# Patient Record
Sex: FEMALE | Race: BLACK OR AFRICAN AMERICAN | NOT HISPANIC OR LATINO | Employment: FULL TIME | URBAN - METROPOLITAN AREA
[De-identification: names, ages, dates, MRNs, and addresses within clinical notes are randomized per-mention and may not be internally consistent; named-entity substitution may affect disease eponyms.]

---

## 2023-04-04 ENCOUNTER — HOSPITAL ENCOUNTER (OUTPATIENT)
Dept: DATA CONVERSION | Facility: HOSPITAL | Age: 28
End: 2023-04-04
Attending: OBSTETRICS & GYNECOLOGY

## 2023-04-04 DIAGNOSIS — Z00.6 ENCOUNTER FOR EXAMINATION FOR NORMAL COMPARISON AND CONTROL IN CLINICAL RESEARCH PROGRAM: ICD-10-CM

## 2023-10-23 PROBLEM — M99.09 SEGMENTAL AND SOMATIC DYSFUNCTION: Status: ACTIVE | Noted: 2023-10-23

## 2023-10-23 PROBLEM — K64.9 HEMORRHOIDS: Status: ACTIVE | Noted: 2023-10-23

## 2023-10-23 PROBLEM — R87.612 LGSIL ON PAP SMEAR OF CERVIX: Status: ACTIVE | Noted: 2023-10-23

## 2023-10-23 PROBLEM — J30.2 SEASONAL ALLERGIES: Status: ACTIVE | Noted: 2023-10-23

## 2023-10-23 RX ORDER — IBUPROFEN 600 MG/1
1 TABLET ORAL EVERY 6 HOURS PRN
COMMUNITY
Start: 2023-03-21

## 2023-10-24 ENCOUNTER — APPOINTMENT (OUTPATIENT)
Dept: OBSTETRICS AND GYNECOLOGY | Facility: CLINIC | Age: 28
End: 2023-10-24
Payer: COMMERCIAL

## 2023-10-25 ENCOUNTER — PROCEDURE VISIT (OUTPATIENT)
Dept: OBSTETRICS AND GYNECOLOGY | Facility: CLINIC | Age: 28
End: 2023-10-25
Payer: COMMERCIAL

## 2023-10-25 VITALS
SYSTOLIC BLOOD PRESSURE: 114 MMHG | WEIGHT: 285 LBS | DIASTOLIC BLOOD PRESSURE: 69 MMHG | HEART RATE: 74 BPM | BODY MASS INDEX: 40.89 KG/M2

## 2023-10-25 DIAGNOSIS — Z30.432 ENCOUNTER FOR IUD REMOVAL: ICD-10-CM

## 2023-10-25 DIAGNOSIS — Z30.017 NEXPLANON INSERTION: ICD-10-CM

## 2023-10-25 DIAGNOSIS — Z30.09 ENCOUNTER FOR COUNSELING REGARDING CONTRACEPTION: Primary | ICD-10-CM

## 2023-10-25 PROCEDURE — 58301 REMOVE INTRAUTERINE DEVICE: CPT

## 2023-10-25 PROCEDURE — 58301 REMOVE INTRAUTERINE DEVICE: CPT | Mod: GC | Performed by: OBSTETRICS & GYNECOLOGY

## 2023-10-25 PROCEDURE — 11981 INSERTION DRUG DLVR IMPLANT: CPT | Performed by: OBSTETRICS & GYNECOLOGY

## 2023-10-25 PROCEDURE — 2500000004 HC RX 250 GENERAL PHARMACY W/ HCPCS (ALT 636 FOR OP/ED): Mod: SE

## 2023-10-25 RX ADMIN — ETONOGESTREL 1 EACH: 68 IMPLANT SUBCUTANEOUS at 17:00

## 2023-10-25 ASSESSMENT — PAIN SCALES - GENERAL: PAINLEVEL: 0-NO PAIN

## 2023-10-25 NOTE — PROGRESS NOTES
Patient ID: Dorys Mclean is a 28 y.o. female who presents for removal of IUD and insertion of nexplanon.    Patient states she is happy with her IUD but her partner is bothered by the strings during intercourse. Desires nexplanon placement. Discussed option of trimming strings, as well as risks/benefits of nexplanon placement and bleeding profile. Patient desires to proceed with removal/insertion of nexplanon.      Insertion of Contraceptive Capsule    Date/Time: 10/25/2023 5:07 PM    Performed by: Kelli Paz MD  Authorized by: Thuy Livingston MD    Consent:     Consent obtained:  Verbal    Consent given by:  Patient    Procedural risks discussed:  Bleeding and infection    Patient questions answered: yes      Patient agrees, verbalizes understanding, and wants to proceed: yes      Instructions and paperwork completed: yes    Indication:     Indication: Insertion of non-biodegradable drug delivery implant    Pre-procedure:     Pre-procedure timeout performed: yes      Prepped with: alcohol 70% and povidone-iodine      Local anesthetic:  Lidocaine 1%    The site was cleaned and prepped in a sterile fashion: yes    Procedure:     Procedure:  Insertion    Small stab incision was made in arm: yes      Left/right:  Left    Preloaded contraceptive capsule trocar was placed subdermally: yes      Visualization of implant was obtained: yes      Contraceptive capsule was inserted and trocar removed: yes      Visualization of notch in stylet and palpation of device: yes      Palpation confirms placement by provider and patient: yes      Site was closed with steri-strips and pressure bandage applied: yes    IUD Removal    Date/Time: 10/25/2023 5:19 PM    Performed by: Kelli Paz MD  Authorized by: Thuy Livingston MD    Consent:     Consent obtained:  Verbal    Consent given by:  Patient    Procedure risks and benefits discussed: yes      Patient questions answered: yes      Patient agrees, verbalizes  understanding, and wants to proceed: yes    Procedure:     Removed with no complications: yes    Comments:      Speculum was placed. Cerivx was visualized with IUD strings in appropriate location. Strings grasped with ring forceps and IUD removed with gentle traction. Specimen evaluated and found to be complete.      RTC as needed and for annual GYN exam    Procedure completed with Dr. Livingston at bedside.  Kelli Paz MD  PGY-2, Obstetrics and Gynecology

## 2024-01-29 ENCOUNTER — HOSPITAL ENCOUNTER (EMERGENCY)
Facility: HOSPITAL | Age: 29
Discharge: HOME | End: 2024-01-29
Attending: EMERGENCY MEDICINE
Payer: COMMERCIAL

## 2024-01-29 ENCOUNTER — APPOINTMENT (OUTPATIENT)
Dept: RADIOLOGY | Facility: HOSPITAL | Age: 29
End: 2024-01-29
Payer: COMMERCIAL

## 2024-01-29 VITALS
DIASTOLIC BLOOD PRESSURE: 64 MMHG | TEMPERATURE: 99.4 F | HEART RATE: 99 BPM | SYSTOLIC BLOOD PRESSURE: 104 MMHG | RESPIRATION RATE: 20 BRPM | HEIGHT: 70 IN | BODY MASS INDEX: 41.23 KG/M2 | WEIGHT: 288 LBS | OXYGEN SATURATION: 100 %

## 2024-01-29 DIAGNOSIS — J22 ACUTE RESPIRATORY INFECTION: ICD-10-CM

## 2024-01-29 LAB
ALBUMIN SERPL BCP-MCNC: 4.3 G/DL (ref 3.4–5)
ALP SERPL-CCNC: 57 U/L (ref 33–110)
ALT SERPL W P-5'-P-CCNC: 16 U/L (ref 7–45)
ANION GAP SERPL CALC-SCNC: 15 MMOL/L (ref 10–20)
APPEARANCE UR: CLEAR
AST SERPL W P-5'-P-CCNC: 15 U/L (ref 9–39)
BASOPHILS # BLD AUTO: 0.05 X10*3/UL (ref 0–0.1)
BASOPHILS NFR BLD AUTO: 0.4 %
BILIRUB SERPL-MCNC: 0.5 MG/DL (ref 0–1.2)
BILIRUB UR STRIP.AUTO-MCNC: NEGATIVE MG/DL
BUN SERPL-MCNC: 9 MG/DL (ref 6–23)
CALCIUM SERPL-MCNC: 9.6 MG/DL (ref 8.6–10.6)
CHLORIDE SERPL-SCNC: 104 MMOL/L (ref 98–107)
CO2 SERPL-SCNC: 25 MMOL/L (ref 21–32)
COLOR UR: YELLOW
CREAT SERPL-MCNC: 0.82 MG/DL (ref 0.5–1.05)
D DIMER PPP FEU-MCNC: 623 NG/ML FEU
EGFRCR SERPLBLD CKD-EPI 2021: >90 ML/MIN/1.73M*2
EOSINOPHIL # BLD AUTO: 0.34 X10*3/UL (ref 0–0.7)
EOSINOPHIL NFR BLD AUTO: 2.8 %
ERYTHROCYTE [DISTWIDTH] IN BLOOD BY AUTOMATED COUNT: 13.2 % (ref 11.5–14.5)
FLUAV RNA RESP QL NAA+PROBE: NOT DETECTED
FLUBV RNA RESP QL NAA+PROBE: NOT DETECTED
GLUCOSE SERPL-MCNC: 100 MG/DL (ref 74–99)
GLUCOSE UR STRIP.AUTO-MCNC: NEGATIVE MG/DL
HCT VFR BLD AUTO: 39.4 % (ref 36–46)
HGB BLD-MCNC: 13.3 G/DL (ref 12–16)
HOLD SPECIMEN: NORMAL
IMM GRANULOCYTES # BLD AUTO: 0.05 X10*3/UL (ref 0–0.7)
IMM GRANULOCYTES NFR BLD AUTO: 0.4 % (ref 0–0.9)
KETONES UR STRIP.AUTO-MCNC: NEGATIVE MG/DL
LEUKOCYTE ESTERASE UR QL STRIP.AUTO: NEGATIVE
LYMPHOCYTES # BLD AUTO: 1.81 X10*3/UL (ref 1.2–4.8)
LYMPHOCYTES NFR BLD AUTO: 15.1 %
MCH RBC QN AUTO: 27.1 PG (ref 26–34)
MCHC RBC AUTO-ENTMCNC: 33.8 G/DL (ref 32–36)
MCV RBC AUTO: 80 FL (ref 80–100)
MONOCYTES # BLD AUTO: 0.17 X10*3/UL (ref 0.1–1)
MONOCYTES NFR BLD AUTO: 1.4 %
NEUTROPHILS # BLD AUTO: 9.6 X10*3/UL (ref 1.2–7.7)
NEUTROPHILS NFR BLD AUTO: 79.9 %
NITRITE UR QL STRIP.AUTO: NEGATIVE
NRBC BLD-RTO: 0 /100 WBCS (ref 0–0)
PH UR STRIP.AUTO: 5 [PH]
PLATELET # BLD AUTO: 291 X10*3/UL (ref 150–450)
POTASSIUM SERPL-SCNC: 3.6 MMOL/L (ref 3.5–5.3)
PREGNANCY TEST URINE, POC: NEGATIVE
PROT SERPL-MCNC: 8.1 G/DL (ref 6.4–8.2)
PROT UR STRIP.AUTO-MCNC: NEGATIVE MG/DL
RBC # BLD AUTO: 4.91 X10*6/UL (ref 4–5.2)
RBC # UR STRIP.AUTO: NEGATIVE /UL
SARS-COV-2 RNA RESP QL NAA+PROBE: NOT DETECTED
SODIUM SERPL-SCNC: 140 MMOL/L (ref 136–145)
SP GR UR STRIP.AUTO: 1.02
UROBILINOGEN UR STRIP.AUTO-MCNC: <2 MG/DL
WBC # BLD AUTO: 12 X10*3/UL (ref 4.4–11.3)

## 2024-01-29 PROCEDURE — 81025 URINE PREGNANCY TEST: CPT

## 2024-01-29 PROCEDURE — 99285 EMERGENCY DEPT VISIT HI MDM: CPT | Performed by: EMERGENCY MEDICINE

## 2024-01-29 PROCEDURE — 71275 CT ANGIOGRAPHY CHEST: CPT

## 2024-01-29 PROCEDURE — 99284 EMERGENCY DEPT VISIT MOD MDM: CPT | Mod: 25

## 2024-01-29 PROCEDURE — 85379 FIBRIN DEGRADATION QUANT: CPT

## 2024-01-29 PROCEDURE — 2500000004 HC RX 250 GENERAL PHARMACY W/ HCPCS (ALT 636 FOR OP/ED): Mod: SE

## 2024-01-29 PROCEDURE — 84075 ASSAY ALKALINE PHOSPHATASE: CPT

## 2024-01-29 PROCEDURE — 2500000001 HC RX 250 WO HCPCS SELF ADMINISTERED DRUGS (ALT 637 FOR MEDICARE OP): Mod: SE

## 2024-01-29 PROCEDURE — 85025 COMPLETE CBC W/AUTO DIFF WBC: CPT

## 2024-01-29 PROCEDURE — 71046 X-RAY EXAM CHEST 2 VIEWS: CPT

## 2024-01-29 PROCEDURE — 36415 COLL VENOUS BLD VENIPUNCTURE: CPT

## 2024-01-29 PROCEDURE — 87636 SARSCOV2 & INF A&B AMP PRB: CPT

## 2024-01-29 PROCEDURE — 99284 EMERGENCY DEPT VISIT MOD MDM: CPT | Performed by: EMERGENCY MEDICINE

## 2024-01-29 PROCEDURE — 71275 CT ANGIOGRAPHY CHEST: CPT | Mod: FOREIGN READ | Performed by: RADIOLOGY

## 2024-01-29 PROCEDURE — 71046 X-RAY EXAM CHEST 2 VIEWS: CPT | Mod: FOREIGN READ | Performed by: RADIOLOGY

## 2024-01-29 PROCEDURE — 2550000001 HC RX 255 CONTRASTS: Mod: SE | Performed by: EMERGENCY MEDICINE

## 2024-01-29 PROCEDURE — 96360 HYDRATION IV INFUSION INIT: CPT | Mod: 59

## 2024-01-29 PROCEDURE — 81003 URINALYSIS AUTO W/O SCOPE: CPT

## 2024-01-29 RX ORDER — ACETAMINOPHEN 325 MG/1
650 TABLET ORAL ONCE
Status: COMPLETED | OUTPATIENT
Start: 2024-01-29 | End: 2024-01-29

## 2024-01-29 RX ORDER — IBUPROFEN 600 MG/1
600 TABLET ORAL ONCE
Status: COMPLETED | OUTPATIENT
Start: 2024-01-29 | End: 2024-01-29

## 2024-01-29 RX ORDER — ACETAMINOPHEN 325 MG/1
650 TABLET ORAL EVERY 6 HOURS PRN
Qty: 30 TABLET | Refills: 0 | Status: SHIPPED | OUTPATIENT
Start: 2024-01-29 | End: 2024-02-15 | Stop reason: WASHOUT

## 2024-01-29 RX ORDER — IBUPROFEN 600 MG/1
600 TABLET ORAL EVERY 8 HOURS PRN
Qty: 30 TABLET | Refills: 0 | Status: SHIPPED | OUTPATIENT
Start: 2024-01-29

## 2024-01-29 RX ADMIN — IBUPROFEN 600 MG: 600 TABLET, FILM COATED ORAL at 02:01

## 2024-01-29 RX ADMIN — SODIUM CHLORIDE, POTASSIUM CHLORIDE, SODIUM LACTATE AND CALCIUM CHLORIDE 1000 ML: 600; 310; 30; 20 INJECTION, SOLUTION INTRAVENOUS at 02:03

## 2024-01-29 RX ADMIN — IOHEXOL 75 ML: 350 INJECTION, SOLUTION INTRAVENOUS at 03:05

## 2024-01-29 RX ADMIN — ACETAMINOPHEN 650 MG: 325 TABLET ORAL at 02:01

## 2024-01-29 ASSESSMENT — COLUMBIA-SUICIDE SEVERITY RATING SCALE - C-SSRS
6. HAVE YOU EVER DONE ANYTHING, STARTED TO DO ANYTHING, OR PREPARED TO DO ANYTHING TO END YOUR LIFE?: NO
1. IN THE PAST MONTH, HAVE YOU WISHED YOU WERE DEAD OR WISHED YOU COULD GO TO SLEEP AND NOT WAKE UP?: NO
2. HAVE YOU ACTUALLY HAD ANY THOUGHTS OF KILLING YOURSELF?: NO

## 2024-01-29 ASSESSMENT — PAIN - FUNCTIONAL ASSESSMENT: PAIN_FUNCTIONAL_ASSESSMENT: 0-10

## 2024-01-29 NOTE — ED TRIAGE NOTES
PT arrives from home via EMS with c/o sudden onset fever, chills, shakes and x1 episode of bloody stool.

## 2024-01-29 NOTE — ED PROVIDER NOTES
HPI   Chief Complaint   Patient presents with    Flu Symptoms       Patient is a 28-year-old female presenting to the emergency department with flulike symptoms.  Patient states that she was feeling fine earlier today and roughly an hour prior to arrival, patient developed full body chills, shortness of breath accompanied with difficulty breathing.  Has not been around any sick contacts.  Denies affiliated chest pain.  Denies nausea vomiting or diarrhea, no abdominal pain, no urinary symptoms including blood in the urine.  Patient is endorsing blood after a bowel movement at home but does have a history of hemorrhoids.  Is not on blood thinners.  Denies myalgias or arthralgias.  No cough, congestion or rhinorrhea.  No other acute complaints                          Annita Coma Scale Score: 15                  Patient History   Past Medical History:   Diagnosis Date    Body mass index (BMI) 32.0-32.9, adult 07/29/2016    BMI 32.0-32.9,adult    COVID-19 06/11/2020    COVID-19    Encounter for pregnancy test, result negative 10/24/2022    Urine pregnancy test negative    Obesity complicating pregnancy, unspecified trimester 02/20/2020    Obesity in pregnancy    Obesity, unspecified 08/28/2018    Class 1 obesity    Other conditions influencing health status 08/05/2016    LGSIL on Pap smear    Personal history of other diseases of the digestive system 01/24/2017    History of constipation    Personal history of other diseases of the female genital tract 07/29/2016    History of amenorrhea     Past Surgical History:   Procedure Laterality Date    OTHER SURGICAL HISTORY  10/06/2020    No history of surgery     Family History   Problem Relation Name Age of Onset    Breast cancer Maternal Grandmother      Colon cancer Paternal Grandfather       Social History     Tobacco Use    Smoking status: Never    Smokeless tobacco: Never   Substance Use Topics    Alcohol use: Yes     Alcohol/week: 1.0 standard drink of alcohol      Types: 1 Standard drinks or equivalent per week    Drug use: Never       Physical Exam   ED Triage Vitals [01/29/24 0141]   Temperature Heart Rate Respirations BP   36.8 °C (98.2 °F) (!) 121 (!) 22 102/72      Pulse Ox Temp Source Heart Rate Source Patient Position   100 % Oral -- Sitting      BP Location FiO2 (%)     Left arm --       Physical Exam  Vitals and nursing note reviewed.   Constitutional:       General: She is not in acute distress.     Appearance: Normal appearance. She is not toxic-appearing.   HENT:      Head: Normocephalic.      Mouth/Throat:      Mouth: Mucous membranes are moist.   Eyes:      Conjunctiva/sclera: Conjunctivae normal.      Pupils: Pupils are equal, round, and reactive to light.   Cardiovascular:      Rate and Rhythm: Regular rhythm. Tachycardia present.      Pulses:           Radial pulses are 2+ on the right side and 2+ on the left side.   Pulmonary:      Effort: Pulmonary effort is normal. No respiratory distress.      Comments: Tachypnea with increased effort  Abdominal:      General: Abdomen is flat.      Palpations: Abdomen is soft.      Tenderness: There is no abdominal tenderness. There is no guarding or rebound.   Musculoskeletal:      Cervical back: Normal range of motion and neck supple.      Right lower leg: No edema.      Left lower leg: No edema.   Skin:     General: Skin is warm.   Neurological:      Mental Status: She is alert and oriented to person, place, and time.   Psychiatric:         Mood and Affect: Mood normal.         ED Course & MDM   Diagnoses as of 01/29/24 0358   Acute respiratory infection       Medical Decision Making  28-year-old female presenting to the emergency department with shortness of breath.  Patient is noted to be tachycardic in the 120s and tachypneic, borderline normotensive on exam, speaking in short sentences.  Given the acute onset of her symptoms, with affiliated shivers or rigors, my concern is for flu versus COVID versus viral  infection however cannot exclude acute pulmonary process such as pneumonia, or PE.  Labs ordered, dimer elevated with mild leukocytosis.  CT PE negative for pneumonia or pulmonary embolism.  Patient given IV fluids, Tylenol and ibuprofen for symptomatic management with improvement.  Viral swabs are negative, urine negative for infection.  Patient updated with results and is safe for discharge home if she likely has a viral respiratory infection.  Do not feel that further workup indicated at this time. Discussed results, diagnosis/differential, and plan with patient. Patient advised to follow up with primary physician in 2-3 days. Discussed return precautions and encouraged patient to return to the Emergency Department for any concerning symptoms or worsening condition. Patient expresses understanding and is in agreement. All questions answered. Patient discharged in stable condition.        Procedure  Procedures     Amada Kc DO  Resident  01/29/24 0350

## 2024-02-01 ENCOUNTER — HOSPITAL ENCOUNTER (EMERGENCY)
Facility: HOSPITAL | Age: 29
Discharge: HOME | End: 2024-02-01
Attending: EMERGENCY MEDICINE
Payer: COMMERCIAL

## 2024-02-01 ENCOUNTER — CLINICAL SUPPORT (OUTPATIENT)
Dept: EMERGENCY MEDICINE | Facility: HOSPITAL | Age: 29
End: 2024-02-01
Payer: COMMERCIAL

## 2024-02-01 VITALS
OXYGEN SATURATION: 97 % | HEART RATE: 75 BPM | RESPIRATION RATE: 18 BRPM | WEIGHT: 288 LBS | HEIGHT: 70 IN | TEMPERATURE: 99.2 F | DIASTOLIC BLOOD PRESSURE: 75 MMHG | SYSTOLIC BLOOD PRESSURE: 114 MMHG | BODY MASS INDEX: 41.23 KG/M2

## 2024-02-01 DIAGNOSIS — K64.9 HEMORRHOIDS, UNSPECIFIED HEMORRHOID TYPE: Primary | ICD-10-CM

## 2024-02-01 DIAGNOSIS — K62.5 RECTAL BLEEDING: ICD-10-CM

## 2024-02-01 DIAGNOSIS — B34.9 VIRAL ILLNESS: ICD-10-CM

## 2024-02-01 LAB
ABO GROUP (TYPE) IN BLOOD: NORMAL
ALBUMIN SERPL BCP-MCNC: 4.4 G/DL (ref 3.4–5)
ALP SERPL-CCNC: 56 U/L (ref 33–110)
ALT SERPL W P-5'-P-CCNC: 15 U/L (ref 7–45)
ANION GAP BLDV CALCULATED.4IONS-SCNC: 10 MMOL/L (ref 10–25)
ANION GAP SERPL CALC-SCNC: 15 MMOL/L (ref 10–20)
ANTIBODY SCREEN: NORMAL
APPEARANCE UR: CLEAR
AST SERPL W P-5'-P-CCNC: 12 U/L (ref 9–39)
BASE EXCESS BLDV CALC-SCNC: 5 MMOL/L (ref -2–3)
BASOPHILS # BLD AUTO: 0.06 X10*3/UL (ref 0–0.1)
BASOPHILS NFR BLD AUTO: 0.5 %
BILIRUB SERPL-MCNC: 0.4 MG/DL (ref 0–1.2)
BILIRUB UR STRIP.AUTO-MCNC: NEGATIVE MG/DL
BODY TEMPERATURE: 37 DEGREES CELSIUS
BUN SERPL-MCNC: 9 MG/DL (ref 6–23)
CA-I BLDV-SCNC: 1.24 MMOL/L (ref 1.1–1.33)
CALCIUM SERPL-MCNC: 10 MG/DL (ref 8.6–10.6)
CHLORIDE BLDV-SCNC: 105 MMOL/L (ref 98–107)
CHLORIDE SERPL-SCNC: 105 MMOL/L (ref 98–107)
CO2 SERPL-SCNC: 25 MMOL/L (ref 21–32)
COLOR UR: YELLOW
CREAT SERPL-MCNC: 0.77 MG/DL (ref 0.5–1.05)
EGFRCR SERPLBLD CKD-EPI 2021: >90 ML/MIN/1.73M*2
EOSINOPHIL # BLD AUTO: 0.25 X10*3/UL (ref 0–0.7)
EOSINOPHIL NFR BLD AUTO: 2.3 %
ERYTHROCYTE [DISTWIDTH] IN BLOOD BY AUTOMATED COUNT: 12.8 % (ref 11.5–14.5)
GLUCOSE BLDV-MCNC: 99 MG/DL (ref 74–99)
GLUCOSE SERPL-MCNC: 91 MG/DL (ref 74–99)
GLUCOSE UR STRIP.AUTO-MCNC: NEGATIVE MG/DL
HCO3 BLDV-SCNC: 29.9 MMOL/L (ref 22–26)
HCT VFR BLD AUTO: 38.1 % (ref 36–46)
HCT VFR BLD EST: 40 % (ref 36–46)
HGB BLD-MCNC: 13 G/DL (ref 12–16)
HGB BLDV-MCNC: 13.2 G/DL (ref 12–16)
IMM GRANULOCYTES # BLD AUTO: 0.03 X10*3/UL (ref 0–0.7)
IMM GRANULOCYTES NFR BLD AUTO: 0.3 % (ref 0–0.9)
INHALED O2 CONCENTRATION: 21 %
INR PPP: 1.1 (ref 0.9–1.1)
KETONES UR STRIP.AUTO-MCNC: NEGATIVE MG/DL
LACTATE BLDV-SCNC: 1.7 MMOL/L (ref 0.4–2)
LEUKOCYTE ESTERASE UR QL STRIP.AUTO: ABNORMAL
LYMPHOCYTES # BLD AUTO: 1.71 X10*3/UL (ref 1.2–4.8)
LYMPHOCYTES NFR BLD AUTO: 15.4 %
MCH RBC QN AUTO: 26.9 PG (ref 26–34)
MCHC RBC AUTO-ENTMCNC: 34.1 G/DL (ref 32–36)
MCV RBC AUTO: 79 FL (ref 80–100)
MONOCYTES # BLD AUTO: 0.12 X10*3/UL (ref 0.1–1)
MONOCYTES NFR BLD AUTO: 1.1 %
MUCOUS THREADS #/AREA URNS AUTO: NORMAL /LPF
NEUTROPHILS # BLD AUTO: 8.91 X10*3/UL (ref 1.2–7.7)
NEUTROPHILS NFR BLD AUTO: 80.4 %
NITRITE UR QL STRIP.AUTO: NEGATIVE
NRBC BLD-RTO: 0 /100 WBCS (ref 0–0)
OXYHGB MFR BLDV: 9.6 % (ref 45–75)
PCO2 BLDV: 44 MM HG (ref 41–51)
PH BLDV: 7.44 PH (ref 7.33–7.43)
PH UR STRIP.AUTO: 5 [PH]
PLATELET # BLD AUTO: 305 X10*3/UL (ref 150–450)
PO2 BLDV: 26 MM HG (ref 35–45)
POTASSIUM BLDV-SCNC: 5.1 MMOL/L (ref 3.5–5.3)
POTASSIUM SERPL-SCNC: 4.4 MMOL/L (ref 3.5–5.3)
PREGNANCY TEST URINE, POC: NEGATIVE
PROT SERPL-MCNC: 8 G/DL (ref 6.4–8.2)
PROT UR STRIP.AUTO-MCNC: NEGATIVE MG/DL
PROTHROMBIN TIME: 12.5 SECONDS (ref 9.8–12.8)
RBC # BLD AUTO: 4.84 X10*6/UL (ref 4–5.2)
RBC # UR STRIP.AUTO: NEGATIVE /UL
RBC #/AREA URNS AUTO: NORMAL /HPF
RH FACTOR (ANTIGEN D): NORMAL
SAO2 % BLDV: 10 % (ref 45–75)
SODIUM BLDV-SCNC: 140 MMOL/L (ref 136–145)
SODIUM SERPL-SCNC: 141 MMOL/L (ref 136–145)
SP GR UR STRIP.AUTO: 1.02
SQUAMOUS #/AREA URNS AUTO: NORMAL /HPF
UROBILINOGEN UR STRIP.AUTO-MCNC: 2 MG/DL
WBC # BLD AUTO: 11.1 X10*3/UL (ref 4.4–11.3)
WBC #/AREA URNS AUTO: NORMAL /HPF

## 2024-02-01 PROCEDURE — 86901 BLOOD TYPING SEROLOGIC RH(D): CPT | Performed by: EMERGENCY MEDICINE

## 2024-02-01 PROCEDURE — 81025 URINE PREGNANCY TEST: CPT | Performed by: EMERGENCY MEDICINE

## 2024-02-01 PROCEDURE — 36415 COLL VENOUS BLD VENIPUNCTURE: CPT | Performed by: EMERGENCY MEDICINE

## 2024-02-01 PROCEDURE — 80053 COMPREHEN METABOLIC PANEL: CPT | Performed by: EMERGENCY MEDICINE

## 2024-02-01 PROCEDURE — 84132 ASSAY OF SERUM POTASSIUM: CPT | Performed by: EMERGENCY MEDICINE

## 2024-02-01 PROCEDURE — 96360 HYDRATION IV INFUSION INIT: CPT

## 2024-02-01 PROCEDURE — 85025 COMPLETE CBC W/AUTO DIFF WBC: CPT | Performed by: EMERGENCY MEDICINE

## 2024-02-01 PROCEDURE — 2500000004 HC RX 250 GENERAL PHARMACY W/ HCPCS (ALT 636 FOR OP/ED): Mod: SE | Performed by: EMERGENCY MEDICINE

## 2024-02-01 PROCEDURE — 99283 EMERGENCY DEPT VISIT LOW MDM: CPT | Mod: 25

## 2024-02-01 PROCEDURE — 81001 URINALYSIS AUTO W/SCOPE: CPT | Performed by: EMERGENCY MEDICINE

## 2024-02-01 PROCEDURE — 99284 EMERGENCY DEPT VISIT MOD MDM: CPT | Performed by: NURSE PRACTITIONER

## 2024-02-01 PROCEDURE — 99284 EMERGENCY DEPT VISIT MOD MDM: CPT | Performed by: EMERGENCY MEDICINE

## 2024-02-01 PROCEDURE — 85610 PROTHROMBIN TIME: CPT | Performed by: EMERGENCY MEDICINE

## 2024-02-01 PROCEDURE — 93005 ELECTROCARDIOGRAM TRACING: CPT

## 2024-02-01 RX ORDER — DOCUSATE SODIUM 100 MG/1
100 CAPSULE, LIQUID FILLED ORAL EVERY 12 HOURS
Qty: 60 CAPSULE | Refills: 0 | Status: SHIPPED | OUTPATIENT
Start: 2024-02-01 | End: 2024-02-15 | Stop reason: WASHOUT

## 2024-02-01 RX ORDER — ACETAMINOPHEN 325 MG/1
975 TABLET ORAL ONCE
Status: COMPLETED | OUTPATIENT
Start: 2024-02-01 | End: 2024-02-01

## 2024-02-01 RX ADMIN — ACETAMINOPHEN 975 MG: 325 TABLET ORAL at 21:52

## 2024-02-01 RX ADMIN — SODIUM CHLORIDE 1000 ML: 0.9 INJECTION, SOLUTION INTRAVENOUS at 20:20

## 2024-02-01 ASSESSMENT — ENCOUNTER SYMPTOMS
CHILLS: 1
BLOOD IN STOOL: 1

## 2024-02-01 ASSESSMENT — LIFESTYLE VARIABLES
HAVE PEOPLE ANNOYED YOU BY CRITICIZING YOUR DRINKING: NO
EVER FELT BAD OR GUILTY ABOUT YOUR DRINKING: NO
EVER HAD A DRINK FIRST THING IN THE MORNING TO STEADY YOUR NERVES TO GET RID OF A HANGOVER: NO
HAVE YOU EVER FELT YOU SHOULD CUT DOWN ON YOUR DRINKING: NO

## 2024-02-01 ASSESSMENT — COLUMBIA-SUICIDE SEVERITY RATING SCALE - C-SSRS
2. HAVE YOU ACTUALLY HAD ANY THOUGHTS OF KILLING YOURSELF?: NO
1. IN THE PAST MONTH, HAVE YOU WISHED YOU WERE DEAD OR WISHED YOU COULD GO TO SLEEP AND NOT WAKE UP?: NO
6. HAVE YOU EVER DONE ANYTHING, STARTED TO DO ANYTHING, OR PREPARED TO DO ANYTHING TO END YOUR LIFE?: NO

## 2024-02-01 ASSESSMENT — PAIN SCALES - GENERAL
PAINLEVEL_OUTOF10: 0 - NO PAIN
PAINLEVEL_OUTOF10: 7

## 2024-02-01 ASSESSMENT — PAIN - FUNCTIONAL ASSESSMENT: PAIN_FUNCTIONAL_ASSESSMENT: 0-10

## 2024-02-02 LAB
ATRIAL RATE: 112 BPM
P AXIS: 44 DEGREES
P OFFSET: 201 MS
P ONSET: 156 MS
PR INTERVAL: 138 MS
Q ONSET: 225 MS
QRS COUNT: 18 BEATS
QRS DURATION: 56 MS
QT INTERVAL: 298 MS
QTC CALCULATION(BAZETT): 406 MS
QTC FREDERICIA: 367 MS
R AXIS: 64 DEGREES
T AXIS: 30 DEGREES
T OFFSET: 374 MS
VENTRICULAR RATE: 112 BPM

## 2024-02-02 NOTE — ED PROVIDER NOTES
Emergency Department Encounter  [facility] EMERGENCY MEDICINE    Patient: Dorys Mclean  MRN: [id number]  : 1995  Date of Evaluation: 2024  ED Provider: CHIVO Sullivan      Chief Complaint       Chief Complaint   Patient presents with    Rectal Bleeding        Limitations to History: none  Historian: patient  Records reviewed: EMR inpatient and outpatient notes, Care Everywhere    This is a 28-year-old female without any significant PMH who presents to the emergency room with rectal bleeding.  Patient states that she has been rectally bleeding daily since .  Patient was evaluated at [facility] emergency room on 2024.  Influenza and COVID swab was obtained which was not detected.  Labs within normal limits and a CT PE study was obtained since the patient was mildly tachycardic.  There was no PE on the CT PE study.  Denies any abdominal pain, nausea or vomiting.  Denies any fevers or chills.  Patient states that she has a couple episodes of bleeding a day.  Patient has been taking DayQuil without any relief of symptoms. Patient recently lost her voice.    PMH: Denies  PSH: Denies  Allergies: Denies  Social HX: Denies smoking or alcohol use, + marijuana use.  Family HX: No family history pertinent to current presenting problem  Medications: Denies    ROS:     Review of Systems   Constitutional:  Positive for chills.   Gastrointestinal:  Positive for blood in stool.     14 systems reviewed and otherwise acutely negative except as in the [place].        Past History     Past Medical History:   Diagnosis Date    Body mass index (BMI) 32.0-32.9, adult 2016    BMI 32.0-32.9,adult    COVID-19 2020    COVID-19    Encounter for pregnancy test, result negative 10/24/2022    Urine pregnancy test negative    Obesity complicating pregnancy, unspecified trimester 2020    Obesity in pregnancy    Obesity, unspecified 2018    Class 1 obesity    Other conditions  influencing health status 08/05/2016    LGSIL on Pap smear    Personal history of other diseases of the digestive system 01/24/2017    History of constipation    Personal history of other diseases of the female genital tract 07/29/2016    History of amenorrhea     Past Surgical History:   Procedure Laterality Date    OTHER SURGICAL HISTORY  10/06/2020    No history of surgery         Medications/Allergies     Previous Medications    ACETAMINOPHEN (TYLENOL) 325 MG TABLET    Take 2 tablets (650 mg) by mouth every 6 hours if needed for mild pain (1 - 3) or fever (temp greater than 38.0 C).    IBUPROFEN 600 MG TABLET    Take 1 tablet (600 mg) by mouth every 6 hours if needed for pain.    IBUPROFEN 600 MG TABLET    Take 1 tablet (600 mg) by mouth every 8 hours if needed for mild pain (1 - 3) or fever (temp greater than 38.0 C).    LEVONORGESTREL (LILETTA) 20.4 MCG/24 HRS (8 YRS) 52 MG INTRAUTERINE DEVICE    by intrauterine route.    PRENATAL 105/IRON/FOLIC AC/DHA (PRENA1 TRUE ORAL)    Take by mouth.     No Known Allergies     Physical Exam       ED Triage Vitals [02/01/24 1951]   Temperature Heart Rate Respirations BP   37.3 °C (99.2 °F) (!) 124 (!) 24 (!) 140/94      Pulse Ox Temp Source Heart Rate Source Patient Position   99 % Oral -- --      BP Location FiO2 (%)     -- --       Physical Exam:    Appearance: Alert, oriented , cooperative,  in no acute distress. Well nourished & well hydrated.    Skin: Intact,  dry skin, no lesions, rash, petechiae or purpura.     Eyes: PERRLA, EOMs intact.    ENT: Hearing grossly intact. External auditory canals patent, tympanic membranes intact with visible landmarks. Nares patent, mucus membranes moist. Dentition without lesions. Pharynx clear, uvula midline.     Neck: Supple, without meningismus. Thyroid not palpable. Trachea at midline. No lymphadenopathy.    Pulmonary: Clear bilaterally with good chest wall excursion. No rales, rhonchi or wheezing. No accessory muscle use or  stridor.    Cardiac: Normal S1, S2 without murmur, rub, gallop or extrasystole. No JVD, Carotids without bruits.    Abdomen: Soft, nontender, active bowel sounds.  No palpable organomegaly.  No rebound or guarding.  No CVA tenderness.    Rectal: Exam completed with Britney RN at bedside. Small non thrombosed external hemorrhoid present. No blood.    Musculoskeletal: Full range of motion. no pain, edema, or deformity. Pulses full and equal. No cyanosis, clubbing, or edema.    Neurological:  Cranial nerves II through XII are grossly intact, finger-nose touch is normal, normal sensation, no weakness, no focal findings identified.    Psychiatric: Appropriate mood and affect.       Diagnostics   Labs:  Results for orders placed or performed during the hospital encounter of 02/01/24 (from the past 24 hour(s))   CBC and Auto Differential   Result Value Ref Range    WBC 11.1 4.4 - 11.3 x10*3/uL    nRBC 0.0 0.0 - 0.0 /100 WBCs    RBC 4.84 4.00 - 5.20 x10*6/uL    Hemoglobin 13.0 12.0 - 16.0 g/dL    Hematocrit 38.1 36.0 - 46.0 %    MCV 79 (L) 80 - 100 fL    MCH 26.9 26.0 - 34.0 pg    MCHC 34.1 32.0 - 36.0 g/dL    RDW 12.8 11.5 - 14.5 %    Platelets 305 150 - 450 x10*3/uL    Neutrophils % 80.4 40.0 - 80.0 %    Immature Granulocytes %, Automated 0.3 0.0 - 0.9 %    Lymphocytes % 15.4 13.0 - 44.0 %    Monocytes % 1.1 2.0 - 10.0 %    Eosinophils % 2.3 0.0 - 6.0 %    Basophils % 0.5 0.0 - 2.0 %    Neutrophils Absolute 8.91 (H) 1.20 - 7.70 x10*3/uL    Immature Granulocytes Absolute, Automated 0.03 0.00 - 0.70 x10*3/uL    Lymphocytes Absolute 1.71 1.20 - 4.80 x10*3/uL    Monocytes Absolute 0.12 0.10 - 1.00 x10*3/uL    Eosinophils Absolute 0.25 0.00 - 0.70 x10*3/uL    Basophils Absolute 0.06 0.00 - 0.10 x10*3/uL   Comprehensive metabolic panel   Result Value Ref Range    Glucose 91 74 - 99 mg/dL    Sodium 141 136 - 145 mmol/L    Potassium 4.4 3.5 - 5.3 mmol/L    Chloride 105 98 - 107 mmol/L    Bicarbonate 25 21 - 32 mmol/L    Anion Gap 15  10 - 20 mmol/L    Urea Nitrogen 9 6 - 23 mg/dL    Creatinine 0.77 0.50 - 1.05 mg/dL    eGFR >90 >60 mL/min/1.73m*2    Calcium 10.0 8.6 - 10.6 mg/dL    Albumin 4.4 3.4 - 5.0 g/dL    Alkaline Phosphatase 56 33 - 110 U/L    Total Protein 8.0 6.4 - 8.2 g/dL    AST 12 9 - 39 U/L    Bilirubin, Total 0.4 0.0 - 1.2 mg/dL    ALT 15 7 - 45 U/L   Type and Screen   Result Value Ref Range    ABO TYPE AB     Rh TYPE POS     ANTIBODY SCREEN NEG    Protime-INR   Result Value Ref Range    Protime 12.5 9.8 - 12.8 seconds    INR 1.1 0.9 - 1.1   BLOOD GAS VENOUS FULL PANEL   Result Value Ref Range    POCT pH, Venous 7.44 (H) 7.33 - 7.43 pH    POCT pCO2, Venous 44 41 - 51 mm Hg    POCT pO2, Venous 26 (L) 35 - 45 mm Hg    POCT SO2, Venous 10 (L) 45 - 75 %    POCT Oxy Hemoglobin, Venous 9.6 (L) 45.0 - 75.0 %    POCT Hematocrit Calculated, Venous 40.0 36.0 - 46.0 %    POCT Sodium, Venous 140 136 - 145 mmol/L    POCT Potassium, Venous 5.1 3.5 - 5.3 mmol/L    POCT Chloride, Venous 105 98 - 107 mmol/L    POCT Ionized Calicum, Venous 1.24 1.10 - 1.33 mmol/L    POCT Glucose, Venous 99 74 - 99 mg/dL    POCT Lactate, Venous 1.7 0.4 - 2.0 mmol/L    POCT Base Excess, Venous 5.0 (H) -2.0 - 3.0 mmol/L    POCT HCO3 Calculated, Venous 29.9 (H) 22.0 - 26.0 mmol/L    POCT Hemoglobin, Venous 13.2 12.0 - 16.0 g/dL    POCT Anion Gap, Venous 10.0 10.0 - 25.0 mmol/L    Patient Temperature 37.0 degrees Celsius    FiO2 21 %   Urinalysis with Reflex Microscopic   Result Value Ref Range    Color, Urine Yellow Straw, Yellow    Appearance, Urine Clear Clear    Specific Gravity, Urine 1.020 1.005 - 1.035    pH, Urine 5.0 5.0, 5.5, 6.0, 6.5, 7.0, 7.5, 8.0    Protein, Urine NEGATIVE NEGATIVE mg/dL    Glucose, Urine NEGATIVE NEGATIVE mg/dL    Blood, Urine NEGATIVE NEGATIVE    Ketones, Urine NEGATIVE NEGATIVE mg/dL    Bilirubin, Urine NEGATIVE NEGATIVE    Urobilinogen, Urine 2.0 (N) <2.0 mg/dL    Nitrite, Urine NEGATIVE NEGATIVE    Leukocyte Esterase, Urine TRACE (A)  "NEGATIVE   Microscopic Only, Urine   Result Value Ref Range    WBC, Urine 1-5 1-5, NONE /HPF    RBC, Urine 1-2 NONE, 1-2, 3-5 /HPF    Squamous Epithelial Cells, Urine 1-9 (SPARSE) Reference range not established. /HPF    Mucus, Urine 1+ Reference range not established. /LPF   POCT pregnancy, urine   Result Value Ref Range    Preg Test, Ur Negative Negative      Radiographs:  No orders to display       Procedures:   Procedures     EKG: interpreted by this provider  Time:  Indication:  Rate:  Rhythm:  Axis:  Interval:  ST Segment:  Comparison to Prior:      Assessment   In brief, Dorys Mclean is a 28 y.o. female who presented to the emergency department with rectal bleeding.          ED Course/MDM   Visit Vitals  /75   Pulse 75   Temp 37.3 °C (99.2 °F) (Oral)   Resp 18   Ht 1.778 m (5' 10\")   Wt 131 kg (288 lb)   SpO2 97%   BMI 41.32 kg/m²   OB Status Having periods   Smoking Status Never   BSA 2.54 m²       Medications   sodium chloride 0.9 % bolus 1,000 mL (0 mL intravenous Stopped 2/1/24 2120)   acetaminophen (Tylenol) tablet 975 mg (975 mg oral Given 2/1/24 2152)       Patient remained stable while in the emergency department. Previous outpatient and ED records were reviewed. Outside records were reviewed.  IV established and labs obtained.  CBC was unremarkable.  Comprehensive metabolic panel within normal limits.  Lactate was normal at 1.7.  Urinalysis showed a trace amount of leukocytes without any bacteria present.  Urine pregnancy test was negative.  Patient received 1 L of IV fluids and Tylenol for symptoms.  Rectal exam was completed with a small nonthrombosed hemorrhoid.  No evidence of blood noted.  Patient CBC was stable.  I suspect the patient has a viral illness and bleeding due to a external hemorrhoid.  Patient denies any rectal pain.  Patient was prescribed Colace twice a day as needed for constipation.  Patient was advised to follow-up with her primary care doctor and GI and return the " emergency room with worsening symptoms.    Final Impression      1. Hemorrhoids, unspecified hemorrhoid type    2. Rectal bleeding    3. Viral illness          DISPOSITION  Disposition: Discharged home    Comment: Please note this report has been produced using speech recognition software and may contain errors related to that system including errors in grammar, punctuation, and spelling, as well as words and phrases that may be inappropriate.  If there are any questions or concerns please feel free to contact the dictating provider for clarification.    DONI Sullivan-CHIVO Lynne  02/01/24 9333

## 2024-02-02 NOTE — ED TRIAGE NOTES
Pt. Reports persistent rectal bleeding x1 week. States that it is filling up the toilet bowl with bright red blood. Patient states this happened sometime last year and she came in had a rectal exam that was negative for hemorrhoids, told to follow up with GI doctor. The bleeding had stopped spontaneously so pt. Never followed up. Here Sunday for chills but the rectal bleeding had not started at that time, told she was negative for covid/flu. Pt denies covid/flu symptoms.

## 2024-02-14 NOTE — PROGRESS NOTES
Gastroenterology Clinic New Consult Note    Reason For Consult  BRBPR    History Of Present Illness  Dorys Mclean is a 28 y.o. female with a past medical history of obesity (BMI 41) presents for BRBPR.     Reports seeing blood in stool as well as in toilet water with blood clots starting 2/1 that lasted 1 week. During that period, pt had increased BM of 3 from baseline of 1~2 BM/day. Denies abdominal pain or rectal pain during this episodes. Pt became nauseous/dizzy without vomiting which prompted to the ED. Work up was fairly unremarkable with Hb 13.0 and sent home. Reports that this happened twice in the past 3 yeas but it stopped within few days. On regular day, pt moves 1~2 BM with stool consistency of BS 4~5. Not on AC/AP.     Drinks alcohol occasionally on holidays. No smoke.   Fhx includes grandfather (paternal) with colon cancer.      Past Medical History  Past Medical History:   Diagnosis Date    Body mass index (BMI) 32.0-32.9, adult 07/29/2016    BMI 32.0-32.9,adult    COVID-19 06/11/2020    COVID-19    Encounter for pregnancy test, result negative 10/24/2022    Urine pregnancy test negative    Obesity complicating pregnancy, unspecified trimester 02/20/2020    Obesity in pregnancy    Obesity, unspecified 08/28/2018    Class 1 obesity    Other conditions influencing health status 08/05/2016    LGSIL on Pap smear    Personal history of other diseases of the digestive system 01/24/2017    History of constipation    Personal history of other diseases of the female genital tract 07/29/2016    History of amenorrhea       Surgical History  Past Surgical History:   Procedure Laterality Date    OTHER SURGICAL HISTORY  10/06/2020    No history of surgery       Social History  Social Determinants of Health     Tobacco Use: Low Risk  (2/1/2024)    Patient History     Smoking Tobacco Use: Never     Smokeless Tobacco Use: Never     Passive Exposure: Not on file   Alcohol Use: Not on file   Financial Resource Strain:  Not on file   Food Insecurity: Not on file   Transportation Needs: Not on file   Physical Activity: Not on file   Stress: Not on file   Social Connections: Not on file   Intimate Partner Violence: Not on file   Depression: Not on file   Housing Stability: Not on file   Utilities: Not on file   Digital Equity: Not on file       Family History  Family History   Problem Relation Name Age of Onset    Breast cancer Maternal Grandmother      Colon cancer Paternal Grandfather          Allergies  No Known Allergies    Home Medications    Current Outpatient Medications:     acetaminophen (TylenoL) 325 mg tablet, Take 2 tablets (650 mg) by mouth every 6 hours if needed for mild pain (1 - 3) or fever (temp greater than 38.0 C)., Disp: 30 tablet, Rfl: 0    docusate sodium (Colace) 100 mg capsule, Take 1 capsule (100 mg) by mouth every 12 hours., Disp: 60 capsule, Rfl: 0    ibuprofen 600 mg tablet, Take 1 tablet (600 mg) by mouth every 6 hours if needed for pain., Disp: , Rfl:     ibuprofen 600 mg tablet, Take 1 tablet (600 mg) by mouth every 8 hours if needed for mild pain (1 - 3) or fever (temp greater than 38.0 C)., Disp: 30 tablet, Rfl: 0    levonorgestreL (LILETTA) 20.4 mcg/24 hrs (8 yrs) 52 mg intrauterine device, by intrauterine route., Disp: , Rfl:     prenatal 105/iron/folic ac/dha (PRENA1 TRUE ORAL), Take by mouth., Disp: , Rfl:     Review of Systems  Constitutional: denies fever, chills, weight loss  HEENT: denies oral ulcers, dysphagia  Cardiovascular: denies chest pain  Respiratory: denies shortness of breath  GI: see HPI  Musculoskeletal: denies arthralgia, myalgia  Hem/Onc: denies easy bleeding or bruising  Dermatology: denies jaundice, rash  Psychology: denies depression    All ROS were negative unless otherwise stated above.       Physical Exam   Constitutional: Well developed, no distress, alert and cooperative   Respiratory/Thorax: Patent airways, CTAB, normal breath sounds with good chest expansion, thorax  symmetric   Cardiovascular: Regular, rate and rhythm, no murmurs, 2+ equal pulses of the extremities, normal S 1and S 2   Gastrointestinal: Nondistended, soft, non-tender, no rebound tenderness or guarding, no masses palpable, no organomegaly, +BS, no bruits   Rectal: Small skin tag. No anal fissures. No hemorrhoid appreciated on FRED. Brown stool but no blood.  Neurological: Alert   Psychological: Appropriate mood and behavior    Last Recorded Vitals  There were no vitals taken for this visit.    General: well-nourished, no acute distress  HEENT: PERRLA, EOM intact, no scleral icterus, moist MM  Respiratory: CTA bilaterally, normal work of breathing  Cardiovascular: RRR, no murmurs/rubs/gallops  Abdomen: Soft, nontender, nondistended, bowel sounds present, no masses palpated, no organomeagly  Extremities: no edema, no asterixis  Neuro: alert and oriented, CNII-XII grossly intact, moves all 4 extremities with no focal deficits    Relevant Results    Current Outpatient Medications:     acetaminophen (TylenoL) 325 mg tablet, Take 2 tablets (650 mg) by mouth every 6 hours if needed for mild pain (1 - 3) or fever (temp greater than 38.0 C)., Disp: 30 tablet, Rfl: 0    docusate sodium (Colace) 100 mg capsule, Take 1 capsule (100 mg) by mouth every 12 hours., Disp: 60 capsule, Rfl: 0    ibuprofen 600 mg tablet, Take 1 tablet (600 mg) by mouth every 6 hours if needed for pain., Disp: , Rfl:     ibuprofen 600 mg tablet, Take 1 tablet (600 mg) by mouth every 8 hours if needed for mild pain (1 - 3) or fever (temp greater than 38.0 C)., Disp: 30 tablet, Rfl: 0    levonorgestreL (LILETTA) 20.4 mcg/24 hrs (8 yrs) 52 mg intrauterine device, by intrauterine route., Disp: , Rfl:     prenatal 105/iron/folic ac/dha (PRENA1 TRUE ORAL), Take by mouth., Disp: , Rfl:      Lab Results   Component Value Date    WBC 11.1 02/01/2024    HGB 13.0 02/01/2024    HCT 38.1 02/01/2024    MCV 79 (L) 02/01/2024     02/01/2024     Lab Results    Component Value Date    GLUCOSE 91 02/01/2024    CALCIUM 10.0 02/01/2024     02/01/2024    K 4.4 02/01/2024    CO2 25 02/01/2024     02/01/2024    BUN 9 02/01/2024    CREATININE 0.77 02/01/2024     Lab Results   Component Value Date    ALT 15 02/01/2024    AST 12 02/01/2024    ALKPHOS 56 02/01/2024    BILITOT 0.4 02/01/2024       Imaging:  None  Procedures:  EGD: None  Colon: None    ASSESSMENT/PLAN:    Dorys Mclean is a 28 y.o. female with a past medical history of obesity (BMI 41) presents for BRBPR.     #Hematochezia  Reports episodes of painless hematochezia over the past three years. Besides hematochezia, pt has no symptoms such as abdominal pain, diarrhea to suggest IBD or infectious colitis. Malignancy is less likely giving her age and no other symptoms. I suspect either diverticular bleed or hemorrhoid bleed (though no hemorrhoid observed on rectal exam or palpated during rectal exam). Although there is no red flags (weight loss, family history), giving recurrence symptoms will plan for colonoscopy. Pt is not constipated and does not require laxatives.    Plan  -Avoid NSAIDs   -Colonoscopy to rule out   -RTC PRN     Lee Ann Sands MD

## 2024-02-15 ENCOUNTER — OFFICE VISIT (OUTPATIENT)
Dept: GASTROENTEROLOGY | Facility: HOSPITAL | Age: 29
End: 2024-02-15
Payer: COMMERCIAL

## 2024-02-15 VITALS
HEART RATE: 94 BPM | TEMPERATURE: 97.2 F | BODY MASS INDEX: 40.94 KG/M2 | OXYGEN SATURATION: 98 % | HEIGHT: 70 IN | SYSTOLIC BLOOD PRESSURE: 114 MMHG | WEIGHT: 286 LBS | DIASTOLIC BLOOD PRESSURE: 81 MMHG

## 2024-02-15 DIAGNOSIS — K62.5 BRBPR (BRIGHT RED BLOOD PER RECTUM): Primary | ICD-10-CM

## 2024-02-15 PROCEDURE — 99213 OFFICE O/P EST LOW 20 MIN: CPT | Performed by: STUDENT IN AN ORGANIZED HEALTH CARE EDUCATION/TRAINING PROGRAM

## 2024-02-15 PROCEDURE — 1036F TOBACCO NON-USER: CPT | Performed by: STUDENT IN AN ORGANIZED HEALTH CARE EDUCATION/TRAINING PROGRAM

## 2024-02-15 PROCEDURE — 99203 OFFICE O/P NEW LOW 30 MIN: CPT | Performed by: STUDENT IN AN ORGANIZED HEALTH CARE EDUCATION/TRAINING PROGRAM

## 2024-02-15 RX ORDER — POLYETHYLENE GLYCOL 3350, SODIUM CHLORIDE, SODIUM BICARBONATE, POTASSIUM CHLORIDE 420; 11.2; 5.72; 1.48 G/4L; G/4L; G/4L; G/4L
4000 POWDER, FOR SOLUTION ORAL ONCE
Qty: 4000 ML | Refills: 0 | Status: SHIPPED | OUTPATIENT
Start: 2024-02-15 | End: 2024-02-15

## 2024-02-15 SDOH — ECONOMIC STABILITY: FOOD INSECURITY: WITHIN THE PAST 12 MONTHS, YOU WORRIED THAT YOUR FOOD WOULD RUN OUT BEFORE YOU GOT MONEY TO BUY MORE.: NEVER TRUE

## 2024-02-15 SDOH — ECONOMIC STABILITY: FOOD INSECURITY: WITHIN THE PAST 12 MONTHS, THE FOOD YOU BOUGHT JUST DIDN'T LAST AND YOU DIDN'T HAVE MONEY TO GET MORE.: NEVER TRUE

## 2024-02-15 ASSESSMENT — PATIENT HEALTH QUESTIONNAIRE - PHQ9
1. LITTLE INTEREST OR PLEASURE IN DOING THINGS: NOT AT ALL
2. FEELING DOWN, DEPRESSED OR HOPELESS: NOT AT ALL
SUM OF ALL RESPONSES TO PHQ9 QUESTIONS 1 & 2: 0

## 2024-02-15 ASSESSMENT — LIFESTYLE VARIABLES
SKIP TO QUESTIONS 9-10: 1
HOW OFTEN DO YOU HAVE A DRINK CONTAINING ALCOHOL: MONTHLY OR LESS
AUDIT-C TOTAL SCORE: 1
HOW OFTEN DO YOU HAVE SIX OR MORE DRINKS ON ONE OCCASION: NEVER
HOW MANY STANDARD DRINKS CONTAINING ALCOHOL DO YOU HAVE ON A TYPICAL DAY: 1 OR 2

## 2024-02-15 ASSESSMENT — ENCOUNTER SYMPTOMS
DEPRESSION: 0
OCCASIONAL FEELINGS OF UNSTEADINESS: 0
LOSS OF SENSATION IN FEET: 0

## 2024-02-15 ASSESSMENT — PAIN SCALES - GENERAL: PAINLEVEL: 0-NO PAIN

## 2024-02-15 NOTE — PROGRESS NOTES
I saw and evaluated the patient. I personally obtained the key and critical portions of the history and physical exam or was physically present for key and critical portions performed by the resident/fellow. I reviewed the resident/fellow's documentation and discussed the patient with the resident/fellow. I agree with the resident/fellow's medical decision making as documented in the note.    I spent 15 minutes in the professional and overall care of this patient.    Haylie Richardson MD

## 2024-02-15 NOTE — PATIENT INSTRUCTIONS
It was a pleasure working with you today.  As we discussed lets plan for colonoscopy. I sent prep to your pharmacy.    Please contact me via my chart or via  ([phone]) if you have any question.   Looking forward to seeing you again!

## 2024-05-30 ENCOUNTER — ANESTHESIA (OUTPATIENT)
Dept: GASTROENTEROLOGY | Facility: HOSPITAL | Age: 29
End: 2024-05-30
Payer: COMMERCIAL

## 2024-05-30 ENCOUNTER — ANESTHESIA EVENT (OUTPATIENT)
Dept: GASTROENTEROLOGY | Facility: HOSPITAL | Age: 29
End: 2024-05-30
Payer: COMMERCIAL

## 2024-05-30 ENCOUNTER — HOSPITAL ENCOUNTER (OUTPATIENT)
Dept: GASTROENTEROLOGY | Facility: HOSPITAL | Age: 29
Setting detail: OUTPATIENT SURGERY
Discharge: HOME | End: 2024-05-30
Payer: COMMERCIAL

## 2024-05-30 VITALS
RESPIRATION RATE: 13 BRPM | OXYGEN SATURATION: 94 % | HEART RATE: 86 BPM | DIASTOLIC BLOOD PRESSURE: 84 MMHG | TEMPERATURE: 97.2 F | SYSTOLIC BLOOD PRESSURE: 102 MMHG

## 2024-05-30 DIAGNOSIS — K62.5 BRBPR (BRIGHT RED BLOOD PER RECTUM): Primary | ICD-10-CM

## 2024-05-30 PROBLEM — K92.2 GI BLEED: Status: ACTIVE | Noted: 2024-05-30

## 2024-05-30 PROBLEM — E66.9 OBESITY: Status: ACTIVE | Noted: 2024-05-30

## 2024-05-30 PROCEDURE — 2720000007 HC OR 272 NO HCPCS

## 2024-05-30 PROCEDURE — 2500000004 HC RX 250 GENERAL PHARMACY W/ HCPCS (ALT 636 FOR OP/ED): Mod: SE

## 2024-05-30 PROCEDURE — 0753T DGTZ GLS MCRSCP SLD LEVEL IV: CPT | Mod: TC,SUR | Performed by: INTERNAL MEDICINE

## 2024-05-30 PROCEDURE — 3700000002 HC GENERAL ANESTHESIA TIME - EACH INCREMENTAL 1 MINUTE

## 2024-05-30 PROCEDURE — 7100000010 HC PHASE TWO TIME - EACH INCREMENTAL 1 MINUTE

## 2024-05-30 PROCEDURE — 7100000009 HC PHASE TWO TIME - INITIAL BASE CHARGE

## 2024-05-30 PROCEDURE — 45385 COLONOSCOPY W/LESION REMOVAL: CPT | Performed by: INTERNAL MEDICINE

## 2024-05-30 PROCEDURE — 3700000001 HC GENERAL ANESTHESIA TIME - INITIAL BASE CHARGE

## 2024-05-30 RX ORDER — MIDAZOLAM HYDROCHLORIDE 1 MG/ML
INJECTION INTRAMUSCULAR; INTRAVENOUS AS NEEDED
Status: DISCONTINUED | OUTPATIENT
Start: 2024-05-30 | End: 2024-05-30

## 2024-05-30 RX ORDER — LIDOCAINE HYDROCHLORIDE 10 MG/ML
0.1 INJECTION INFILTRATION; PERINEURAL ONCE
OUTPATIENT
Start: 2024-05-30 | End: 2024-05-30

## 2024-05-30 RX ORDER — PROPOFOL 10 MG/ML
INJECTION, EMULSION INTRAVENOUS CONTINUOUS PRN
Status: DISCONTINUED | OUTPATIENT
Start: 2024-05-30 | End: 2024-05-30

## 2024-05-30 RX ORDER — SODIUM CHLORIDE, SODIUM LACTATE, POTASSIUM CHLORIDE, CALCIUM CHLORIDE 600; 310; 30; 20 MG/100ML; MG/100ML; MG/100ML; MG/100ML
100 INJECTION, SOLUTION INTRAVENOUS CONTINUOUS
OUTPATIENT
Start: 2024-05-30

## 2024-05-30 RX ORDER — ETONOGESTREL 68 MG/1
1 IMPLANT SUBCUTANEOUS ONCE
COMMUNITY

## 2024-05-30 RX ORDER — ONDANSETRON HYDROCHLORIDE 2 MG/ML
4 INJECTION, SOLUTION INTRAVENOUS ONCE AS NEEDED
OUTPATIENT
Start: 2024-05-30

## 2024-05-30 RX ADMIN — MIDAZOLAM HYDROCHLORIDE 2 MG: 1 INJECTION, SOLUTION INTRAMUSCULAR; INTRAVENOUS at 08:14

## 2024-05-30 RX ADMIN — SODIUM CHLORIDE, SODIUM LACTATE, POTASSIUM CHLORIDE, AND CALCIUM CHLORIDE: 600; 310; 30; 20 INJECTION, SOLUTION INTRAVENOUS at 08:14

## 2024-05-30 RX ADMIN — PROPOFOL 50 MG: 10 INJECTION, EMULSION INTRAVENOUS at 08:20

## 2024-05-30 RX ADMIN — PROPOFOL 60 MG: 10 INJECTION, EMULSION INTRAVENOUS at 08:18

## 2024-05-30 RX ADMIN — PROPOFOL 250 MCG/KG/MIN: 10 INJECTION, EMULSION INTRAVENOUS at 08:17

## 2024-05-30 ASSESSMENT — PAIN - FUNCTIONAL ASSESSMENT
PAIN_FUNCTIONAL_ASSESSMENT: 0-10

## 2024-05-30 ASSESSMENT — PAIN SCALES - GENERAL
PAINLEVEL_OUTOF10: 0 - NO PAIN
PAIN_LEVEL: 0

## 2024-05-30 ASSESSMENT — COLUMBIA-SUICIDE SEVERITY RATING SCALE - C-SSRS
1. IN THE PAST MONTH, HAVE YOU WISHED YOU WERE DEAD OR WISHED YOU COULD GO TO SLEEP AND NOT WAKE UP?: NO
6. HAVE YOU EVER DONE ANYTHING, STARTED TO DO ANYTHING, OR PREPARED TO DO ANYTHING TO END YOUR LIFE?: NO
2. HAVE YOU ACTUALLY HAD ANY THOUGHTS OF KILLING YOURSELF?: NO

## 2024-05-30 NOTE — ANESTHESIA POSTPROCEDURE EVALUATION
Patient: Dorys Mclean    Procedure Summary       Date: 05/30/24 Room / Location: [facility]    Anesthesia Start: 0814 Anesthesia Stop: 0844    Procedure: COLONOSCOPY Diagnosis:       BRBPR (bright red blood per rectum)      BRBPR (bright red blood per rectum)    Scheduled Providers: Les Garibay MD; Ye Alexandre MD Responsible Provider: Ye Alexandre MD    Anesthesia Type: MAC ASA Status: 2            Anesthesia Type: MAC    Vitals Value Taken Time   /65 05/30/24 0841   Temp 36.2 °C (97.2 °F) 05/30/24 0841   Pulse 83 05/30/24 0841   Resp 12 05/30/24 0841   SpO2 99 % 05/30/24 0841       Anesthesia Post Evaluation    Patient location during evaluation: PACU  Patient participation: complete - patient participated  Level of consciousness: awake  Pain score: 0  Pain management: adequate  Airway patency: patent  Cardiovascular status: acceptable  Respiratory status: acceptable  Hydration status: acceptable  Postoperative Nausea and Vomiting: none    No notable events documented.

## 2024-05-30 NOTE — H&P
History Of Present Illness  Dorys Mclean is a 28 y.o. female presenting with bright red blood per rectum here for colonoscopy.     Past Medical History  Past Medical History:   Diagnosis Date    Body mass index (BMI) 32.0-32.9, adult 07/29/2016    BMI 32.0-32.9,adult    COVID-19 06/11/2020    COVID-19    Encounter for pregnancy test, result negative 10/24/2022    Urine pregnancy test negative    Obesity complicating pregnancy, unspecified trimester ([facility]) 02/20/2020    Obesity in pregnancy    Obesity, unspecified 08/28/2018    Class 1 obesity    Other conditions influencing health status 08/05/2016    LGSIL on Pap smear    Personal history of other diseases of the digestive system 01/24/2017    History of constipation    Personal history of other diseases of the female genital tract 07/29/2016    History of amenorrhea     Surgical History  Past Surgical History:   Procedure Laterality Date    OTHER SURGICAL HISTORY  10/06/2020    No history of surgery     Social History  She reports that she has never smoked. She has never been exposed to tobacco smoke. She has never used smokeless tobacco. She reports current alcohol use of about 1.0 standard drink of alcohol per week. She reports that she does not use drugs.    Family History  Family History   Problem Relation Name Age of Onset    Breast cancer Maternal Grandmother      Colon cancer Paternal Grandfather          Allergies  No Known Allergies       Physical Exam  Constitutional:       Appearance: She is obese.   HENT:      Mouth/Throat:      Mouth: Mucous membranes are moist.   Eyes:      Conjunctiva/sclera: Conjunctivae normal.   Cardiovascular:      Rate and Rhythm: Normal rate.   Pulmonary:      Effort: Pulmonary effort is normal.   Abdominal:      Palpations: Abdomen is soft.   Skin:     General: Skin is warm.   Neurological:      Mental Status: She is oriented to person, place, and time.   Psychiatric:         Mood and Affect: Mood normal.          Last  Recorded Vitals  There were no vitals taken for this visit.    Assessment/Plan   bright red blood per rectum here for colonoscopy     Les Garibay MD

## 2024-05-30 NOTE — ANESTHESIA PREPROCEDURE EVALUATION
Patient: Dorys Mclean    Procedure Information       Date/Time: 05/30/24 0810    Scheduled providers: Les Garibay MD; Ye Alexandre MD    Procedure: COLONOSCOPY    Location: [facility]          HPI: Dorys Mclean is a 28 y.o. female presenting with bright red blood per rectum here for colonoscopy.     Relevant Problems   Cardiac (within normal limits)      Pulmonary (within normal limits)      Neuro (within normal limits)      GI   (+) GI bleed      Endocrine   (+) Obesity      Musculoskeletal (within normal limits)      HEENT   (+) Seasonal allergies       Past Surgical History:   Procedure Laterality Date   • OTHER SURGICAL HISTORY  10/06/2020    No history of surgery     Past Medical History:   Diagnosis Date   • Body mass index (BMI) 32.0-32.9, adult 07/29/2016    BMI 32.0-32.9,adult   • COVID-19 06/11/2020    COVID-19   • Encounter for pregnancy test, result negative 10/24/2022    Urine pregnancy test negative   • Obesity complicating pregnancy, unspecified trimester ([facility]) 02/20/2020    Obesity in pregnancy   • Obesity, unspecified 08/28/2018    Class 1 obesity   • Other conditions influencing health status 08/05/2016    LGSIL on Pap smear   • Personal history of other diseases of the digestive system 01/24/2017    History of constipation   • Personal history of other diseases of the female genital tract 07/29/2016    History of amenorrhea       Current Outpatient Medications:   •  ibuprofen 600 mg tablet, Take 1 tablet (600 mg) by mouth every 6 hours if needed for pain., Disp: , Rfl:   •  ibuprofen 600 mg tablet, Take 1 tablet (600 mg) by mouth every 8 hours if needed for mild pain (1 - 3) or fever (temp greater than 38.0 C). (Patient not taking: Reported on 2/15/2024), Disp: 30 tablet, Rfl: 0  •  levonorgestreL (LILETTA) 20.4 mcg/24 hrs (8 yrs) 52 mg intrauterine device, by intrauterine route., Disp: , Rfl:   •  prenatal 105/iron/folic ac/dha (PRENA1 TRUE ORAL), Take by  mouth., Disp: , Rfl:   Prior to Admission medications    Medication Sig Start Date End Date Taking? Authorizing Provider   ibuprofen 600 mg tablet Take 1 tablet (600 mg) by mouth every 6 hours if needed for pain. 3/21/23   Historical Provider, MD   ibuprofen 600 mg tablet Take 1 tablet (600 mg) by mouth every 8 hours if needed for mild pain (1 - 3) or fever (temp greater than 38.0 C).  Patient not taking: Reported on 2/15/2024 1/29/24   Amada Kc DO   levonorgestreL (LILETTA) 20.4 mcg/24 hrs (8 yrs) 52 mg intrauterine device by intrauterine route.    Historical Provider, MD   prenatal 105/iron/folic ac/dha (PRENA1 TRUE ORAL) Take by mouth.    Historical Provider, MD     No Known Allergies  Social History     Tobacco Use   • Smoking status: Never     Passive exposure: Never   • Smokeless tobacco: Never   Substance Use Topics   • Alcohol use: Yes     Alcohol/week: 1.0 standard drink of alcohol     Types: 1 Standard drinks or equivalent per week         Chemistry    Lab Results   Component Value Date/Time     02/01/2024 2004    K 4.4 02/01/2024 2004     02/01/2024 2004    CO2 25 02/01/2024 2004    BUN 9 02/01/2024 2004    CREATININE 0.77 02/01/2024 2004    Lab Results   Component Value Date/Time    CALCIUM 10.0 02/01/2024 2004    ALKPHOS 56 02/01/2024 2004    AST 12 02/01/2024 2004    ALT 15 02/01/2024 2004    BILITOT 0.4 02/01/2024 2004          Lab Results   Component Value Date/Time    WBC 11.1 02/01/2024 2004    HGB 13.0 02/01/2024 2004    HCT 38.1 02/01/2024 2004     02/01/2024 2004     Lab Results   Component Value Date/Time    PROTIME 12.5 02/01/2024 2004    INR 1.1 02/01/2024 2004     Encounter Date: 02/01/24   ECG 12 lead   Result Value    Ventricular Rate 112    Atrial Rate 112    RI Interval 138    QRS Duration 56    QT Interval 298    QTC Calculation(Bazett) 406    P Axis 44    R Axis 64    T Axis 30    QRS Count 18    Q Onset 225    P Onset 156    P Offset 201    T Offset 374    QTC  [place] 367    Narrative    Sinus tachycardia  Otherwise normal ECG  When compared with ECG of 04-JUN-2020 11:41,  No significant change was found    See ED provider note for full interpretation and clinical correlation    Confirmed by Jackie Valles ([id number]) on 2/2/2024 11:35:40 PM     No results found for this or any previous visit from the past 1095 days.    Clinical information reviewed:                   NPO Detail:  No data recorded     Physical Exam    Airway  Mallampati: III  TM distance: >3 FB     Cardiovascular - normal exam  Rhythm: regular  Rate: normal     Dental - normal exam     Pulmonary - normal exam     Abdominal - normal exam  Abdomen: soft         Anesthesia Plan    History of general anesthesia?: no  History of complications of general anesthesia?: unknown/emergency    ASA 2     MAC     intravenous induction   Anesthetic plan and risks discussed with patient.    Plan discussed with CAA and attending.

## 2024-05-30 NOTE — DISCHARGE INSTRUCTIONS

## 2024-06-04 LAB
LABORATORY COMMENT REPORT: NORMAL
PATH REPORT.FINAL DX SPEC: NORMAL
PATH REPORT.GROSS SPEC: NORMAL
PATH REPORT.TOTAL CANCER: NORMAL

## 2024-06-04 NOTE — RESULT ENCOUNTER NOTE
A  [employer] message was sent to patient regarding the results and recommendations as follows:    Dear Ms. Mclean,    I am writing you to inform you of the good news that the polyp that we removed from your colon revealed no pre-cancerous changes or cancer in it.  A repeat average-risk screening colonoscopy is recommended beginning at age 45.    Copies of all the reports were sent to your referring gastroenterologists and your primary care physicians. Follow up with them for further evaluation and management.    If you have any questions regarding your colonoscopy and/or pathology results, please do not hesitate to contact me at [phone] or reply to this message.  Thank you for allowing us to participate in your medical care.    Sincerely,    Les Garibay MD, JORGITO  Chief Medical   [facility]  Associate Chief and Director of Clinical Operations  Division of Gastroenterology and Liver Disease   Master Clinician  [facility]  Professor of Medicine  [facility]

## 2024-06-13 ENCOUNTER — PROCEDURE VISIT (OUTPATIENT)
Dept: OBSTETRICS AND GYNECOLOGY | Facility: CLINIC | Age: 29
End: 2024-06-13
Payer: COMMERCIAL

## 2024-06-13 ENCOUNTER — LAB (OUTPATIENT)
Dept: LAB | Facility: LAB | Age: 29
End: 2024-06-13
Payer: COMMERCIAL

## 2024-06-13 VITALS
WEIGHT: 293 LBS | HEART RATE: 78 BPM | DIASTOLIC BLOOD PRESSURE: 70 MMHG | SYSTOLIC BLOOD PRESSURE: 105 MMHG | BODY MASS INDEX: 44.15 KG/M2

## 2024-06-13 DIAGNOSIS — Z11.3 ROUTINE SCREENING FOR STI (SEXUALLY TRANSMITTED INFECTION): ICD-10-CM

## 2024-06-13 DIAGNOSIS — Z30.09 ENCOUNTER FOR COUNSELING REGARDING CONTRACEPTION: ICD-10-CM

## 2024-06-13 DIAGNOSIS — Z30.46 ENCOUNTER FOR NEXPLANON REMOVAL: Primary | ICD-10-CM

## 2024-06-13 LAB
HBV SURFACE AG SERPL QL IA: NONREACTIVE
HCV AB SER QL: NONREACTIVE
HIV 1+2 AB+HIV1 P24 AG SERPL QL IA: NONREACTIVE
TREPONEMA PALLIDUM IGG+IGM AB [PRESENCE] IN SERUM OR PLASMA BY IMMUNOASSAY: NONREACTIVE

## 2024-06-13 PROCEDURE — 36415 COLL VENOUS BLD VENIPUNCTURE: CPT

## 2024-06-13 PROCEDURE — 11982 REMOVE DRUG IMPLANT DEVICE: CPT | Performed by: ADVANCED PRACTICE MIDWIFE

## 2024-06-13 PROCEDURE — 87661 TRICHOMONAS VAGINALIS AMPLIF: CPT | Performed by: ADVANCED PRACTICE MIDWIFE

## 2024-06-13 PROCEDURE — 87591 N.GONORRHOEAE DNA AMP PROB: CPT | Performed by: ADVANCED PRACTICE MIDWIFE

## 2024-06-13 PROCEDURE — 87389 HIV-1 AG W/HIV-1&-2 AB AG IA: CPT

## 2024-06-13 PROCEDURE — 86803 HEPATITIS C AB TEST: CPT

## 2024-06-13 PROCEDURE — 87340 HEPATITIS B SURFACE AG IA: CPT

## 2024-06-13 PROCEDURE — 99213 OFFICE O/P EST LOW 20 MIN: CPT | Performed by: ADVANCED PRACTICE MIDWIFE

## 2024-06-13 PROCEDURE — 86780 TREPONEMA PALLIDUM: CPT

## 2024-06-13 ASSESSMENT — ENCOUNTER SYMPTOMS
ENDOCRINE NEGATIVE: 0
HEMATOLOGIC/LYMPHATIC NEGATIVE: 0
CONSTITUTIONAL NEGATIVE: 0
MUSCULOSKELETAL NEGATIVE: 0
NEUROLOGICAL NEGATIVE: 0
RESPIRATORY NEGATIVE: 0
ALLERGIC/IMMUNOLOGIC NEGATIVE: 0
GASTROINTESTINAL NEGATIVE: 0
PSYCHIATRIC NEGATIVE: 0
CARDIOVASCULAR NEGATIVE: 0
EYES NEGATIVE: 0

## 2024-06-13 ASSESSMENT — PAIN SCALES - GENERAL: PAINLEVEL: 0-NO PAIN

## 2024-06-13 NOTE — PROGRESS NOTES
Deepali Saul  is a 28 y.o. female who presents for Nexplanon removal.    HPI    Nexplanon placed 10/2023  Does not like side effects of Nexplanon including irregular bleeding and reports increase in panic attacks/anxiety since insertion     Menstrual History:  OB History    Para Term  AB Living   3 3 3     3   SAB IAB Ectopic Multiple Live Births           3      # Outcome Date GA Lbr Christiano/2nd Weight Sex Delivery Anes PTL Lv   3 Term 20    M Vag-Spont   KADEN   2 Term 17    M Vag-Spont   KADEN   1 Term 14 39w6d 21:30 / 00:38 2.807 kg F Vag-Vacuum EPI  KADEN     No LMP recorded (lmp unknown). Patient has had an implant.     Objective   /70   Pulse 78   Wt 140 kg (307 lb 11.2 oz)   LMP  (LMP Unknown) Comment: neg preg test  BMI 44.15 kg/m²     Procedure:  Subdermal Contraceptive Implant Removal  Date of Insertion:  10/2023  Date of Removal:  24    Information related to removal of the implant:   Reason(s) for removal: Irregular bleeding  Was implant palpable before removal? Yes    Procedure:    Implant identified. Left upper arm prepped with Betadinex3. 1% lidocaine injected at planned incision site. A vertical incision 3 mm was performed with a scalpel at the distal end of implant. The implant was removed using a hemostat. The implant was inspected and found to be intact and complete.  Steri strips and a pressure dressing were applied to the site. After removal instructions were given and verbally reviewed with the patient who acknowledged her understanding.    Difficulties with the implant removal procedure?  no    Plans for contraception:  no method, abstinence, condoms    Other follow-up needed:   annual/pap      Diagnoses and all orders for this visit:  Encounter for Nexplanon removal  Routine screening for STI (sexually transmitted infection)  -     C. Trachomatis / N. Gonorrhoeae, Amplified Detection  -     Trichomonas vaginalis, Nucleic Acid Detection  -      Hepatitis C Antibody; Future  -     Hepatitis B Surface Antigen; Future  -     HIV 1/2 Antigen/Antibody Screen with Reflex to Confirmation; Future  -     Syphilis Screen with Reflex; Future  Encounter for counseling regarding contraception  Discussed birth control options including LARC methods (IUD and nexplanon), nuvaring, depo and oral contraception. Reviewed levels of effectiveness, risks/benefits and side effects with mentioned methods.No contraindications identified in patient history. Patient declines at this time. Would like to take a break from birth control. Not currently sexually active.     RTC for annual with pap     Ambar Bray, DONI-CNM, APRN-CNP

## 2024-06-14 LAB
C TRACH RRNA SPEC QL NAA+PROBE: NEGATIVE
N GONORRHOEA DNA SPEC QL PROBE+SIG AMP: NEGATIVE
T VAGINALIS RRNA SPEC QL NAA+PROBE: NEGATIVE

## 2024-08-02 ENCOUNTER — OFFICE VISIT (OUTPATIENT)
Dept: OBSTETRICS AND GYNECOLOGY | Facility: CLINIC | Age: 29
End: 2024-08-02
Payer: COMMERCIAL

## 2024-08-02 VITALS
HEIGHT: 70 IN | WEIGHT: 293 LBS | DIASTOLIC BLOOD PRESSURE: 73 MMHG | SYSTOLIC BLOOD PRESSURE: 127 MMHG | BODY MASS INDEX: 41.95 KG/M2 | HEART RATE: 70 BPM

## 2024-08-02 DIAGNOSIS — Z00.00 HEALTHCARE MAINTENANCE: Primary | ICD-10-CM

## 2024-08-02 DIAGNOSIS — R87.612 LGSIL ON PAP SMEAR OF CERVIX: ICD-10-CM

## 2024-08-02 PROBLEM — Z30.46 ENCOUNTER FOR NEXPLANON REMOVAL: Status: RESOLVED | Noted: 2024-06-13 | Resolved: 2024-08-02

## 2024-08-02 PROBLEM — Z30.09 ENCOUNTER FOR COUNSELING REGARDING CONTRACEPTION: Status: RESOLVED | Noted: 2024-06-13 | Resolved: 2024-08-02

## 2024-08-02 PROCEDURE — 99395 PREV VISIT EST AGE 18-39: CPT | Performed by: STUDENT IN AN ORGANIZED HEALTH CARE EDUCATION/TRAINING PROGRAM

## 2024-08-02 PROCEDURE — 3008F BODY MASS INDEX DOCD: CPT | Performed by: STUDENT IN AN ORGANIZED HEALTH CARE EDUCATION/TRAINING PROGRAM

## 2024-08-02 PROCEDURE — 1036F TOBACCO NON-USER: CPT | Performed by: STUDENT IN AN ORGANIZED HEALTH CARE EDUCATION/TRAINING PROGRAM

## 2024-08-02 ASSESSMENT — PAIN SCALES - GENERAL: PAINLEVEL: 0-NO PAIN

## 2024-08-02 NOTE — PROGRESS NOTES
"Dorys Mclean is a 29 y.o.  female who is here for a routine exam.   PCP = Kya Mai    Subjective     Concerns today: none    Gyn History:  Menarche: age 6  Periods are  irregular due to recent removal of nexplanon ,  Dysmenorrhea: none.   Cyclic symptoms include breast tenderness and moodiness.  Sexual activity: no  Current contraception: none currently   Fertility Goals: no desire in next year  STI History: none  Pap History:  - LSIL    Preventative:  Family Hx Breast/Ovarian Ca: none  Mammogram: age 38   Family Hx Colon Ca: Paternal Grandfather - 60's-70's  Last Colonoscopy: 2024 - Normal. Next age 43  DM Screening: Fam Hx, no personal hx. Due today.  DEXA: age 63  HPV vaccination: Gardasil  Exercise: active lifestyle, no particular  Diet: no particular - conscientious  Seat Belt Use: always    Social History:  Living Situation: lives with 3 children - ages 9, 7, 3, 1G 2B  School/Employment: works as   Substance:    Tob: none   EtOH: socially   Other Substances: none  Safe at Home?: Yes  Depression Screen/Mood concerns: Yes - occasionally has down moods. Feels like she manages well.         Objective   /73   Pulse 70   Ht 1.778 m (5' 10\")   Wt 140 kg (309 lb 4 oz)   LMP 07/10/2024 (Approximate)   BMI 44.37 kg/m²   Physical Exam  Vitals reviewed. Exam conducted with a chaperone present.   Constitutional:       Appearance: Normal appearance.   HENT:      Head: Normocephalic.   Cardiovascular:      Rate and Rhythm: Normal rate and regular rhythm.   Pulmonary:      Effort: Pulmonary effort is normal. No respiratory distress.   Chest:   Breasts:     Right: Normal. No swelling, mass or skin change.      Left: Normal. No swelling, mass or skin change.   Abdominal:      General: There is no distension.      Palpations: Abdomen is soft. There is no mass.      Tenderness: There is no abdominal tenderness. There is no guarding or rebound.   Genitourinary:     Comments: Normal " appearing external female genitalia. Vaginal mucosa normal appearing without lesions. Cervix without lesions.   Lymphadenopathy:      Upper Body:      Right upper body: No supraclavicular, axillary or pectoral adenopathy.      Left upper body: No supraclavicular, axillary or pectoral adenopathy.   Skin:     General: Skin is warm and dry.   Neurological:      General: No focal deficit present.      Mental Status: She is alert.   Psychiatric:         Mood and Affect: Mood normal.         Behavior: Behavior normal.         Thought Content: Thought content normal.         Judgment: Judgment normal.             Assessment/Plan      Dorys Mclean is a 29 y.o.  female presenting today for annual exam with the following problems addressed:    Problem List Items Addressed This Visit       LGSIL on Pap smear of cervix     Pap today         Healthcare maintenance - Primary     - Reviewed healthy lifestyle including well rounded diet and exercise (moderate intensity 150min/week; high intensity 75min/week)  - Immunizations: UTD  - Pap collected, will follow up results with patient via myChart or mail  - STI testing declined  - A1c due           Relevant Orders    THINPREP PAP TEST    Hemoglobin A1C

## 2024-08-02 NOTE — ASSESSMENT & PLAN NOTE
- Reviewed healthy lifestyle including well rounded diet and exercise (moderate intensity 150min/week; high intensity 75min/week)  - Immunizations: UTD  - Pap collected, will follow up results with patient via myChart or mail  - STI testing declined  - A1c due

## 2024-09-18 ENCOUNTER — TELEPHONE (OUTPATIENT)
Dept: GASTROENTEROLOGY | Facility: HOSPITAL | Age: 29
End: 2024-09-18

## 2024-09-19 NOTE — TELEPHONE ENCOUNTER
Liu Acosta, could you set up appt with GI fellows clinic or Hepatology clinic with me, which ever comes earlier? Thank you!

## 2024-10-09 NOTE — PROGRESS NOTES
[facility]enterology Clinic New Consult Note    Reason For Consult  Blood per rectum    History Of Present Illness  Dorys Mclean is a 29 y.o. female with a past medical history of obesity here in GI clinic for follow up of BRBPR.     Pt was last seen by Dr. Sands on 2/15 for BRBPR x 3 years. She underwent colonoscopy on 5/30 with 1 small polyp (HP).     Today:    Drinks alcohol occasionally on holidays. No smoke.   Fhx includes grandfather (paternal) with colon cancer.      Past Medical History  Past Medical History:   Diagnosis Date    Body mass index (BMI) 32.0-32.9, adult 07/29/2016    BMI 32.0-32.9,adult    COVID-19 06/11/2020    COVID-19    Encounter for pregnancy test, result negative 10/24/2022    Urine pregnancy test negative    Obesity complicating pregnancy, unspecified trimester ([facility]) 02/20/2020    Obesity in pregnancy    Obesity, unspecified 08/28/2018    Class 1 obesity    Other conditions influencing health status 08/05/2016    LGSIL on Pap smear    Personal history of other diseases of the digestive system 01/24/2017    History of constipation    Personal history of other diseases of the female genital tract 07/29/2016    History of amenorrhea       Surgical History  Past Surgical History:   Procedure Laterality Date    OTHER SURGICAL HISTORY  10/06/2020    No history of surgery       Social History  Social Determinants of Health     Tobacco Use: Low Risk  (8/2/2024)    Patient History     Smoking Tobacco Use: Never     Smokeless Tobacco Use: Never     Passive Exposure: Never   Alcohol Use: Not At Risk (2/15/2024)    AUDIT-C     Frequency of Alcohol Consumption: Monthly or less     Average Number of Drinks: 1 or 2     Frequency of Binge Drinking: Never   Financial Resource Strain: Not on file   Food Insecurity: No Food Insecurity (2/15/2024)    Hunger Vital Sign     Worried About Running Out of Food in the Last Year: Never true     Ran Out of Food in the Last Year: Never true   Transportation Needs:  Not on file   Physical Activity: Not on file   Stress: Not on file   Social Connections: Not on file   Intimate Partner Violence: Not on file   Depression: Not at risk (2/15/2024)    PHQ-2     PHQ-2 Score: 0   Housing Stability: Not on file   Utilities: Not on file   Digital Equity: Not on file   Health Literacy: Not on file       Family History  Family History   Problem Relation Name Age of Onset    Breast cancer Maternal Grandmother      Colon cancer Paternal Grandfather          Allergies  No Known Allergies    Home Medications  No current outpatient medications on file.    Review of Systems  ***     Physical Exam     Last Recorded Vitals  There were no vitals taken for this visit.    General: well-nourished, no acute distress  HEENT: PERRLA, EOM intact, no scleral icterus, moist MM  Respiratory: CTA bilaterally, normal work of breathing  Cardiovascular: RRR, no murmurs/rubs/gallops  Abdomen: Soft, nontender, nondistended, bowel sounds present, no masses palpated, no organomeagly  Extremities: no edema, no asterixis  Neuro: alert and oriented, CNII-XII grossly intact, moves all 4 extremities with no focal deficits    Relevant Results  No current outpatient medications on file.     Lab Results   Component Value Date    WBC 11.1 02/01/2024    HGB 13.0 02/01/2024    HCT 38.1 02/01/2024    MCV 79 (L) 02/01/2024     02/01/2024     Lab Results   Component Value Date    GLUCOSE 91 02/01/2024    CALCIUM 10.0 02/01/2024     02/01/2024    K 4.4 02/01/2024    CO2 25 02/01/2024     02/01/2024    BUN 9 02/01/2024    CREATININE 0.77 02/01/2024     Lab Results   Component Value Date    ALT 15 02/01/2024    AST 12 02/01/2024    ALKPHOS 56 02/01/2024    BILITOT 0.4 02/01/2024       Imaging: no cross sectional abd imaging     Procedures:  Colon (5/30/2024);  Result Text      Findings  Normal terminal ileum, ileocecal valve, and appendiceal orifice (photodocumented).  One 3 mm sessile Julienne Is polyp in the distal  sigmoid colon; performed cold snare with complete en bloc removal and retrieved specimen  External small hemorrhoids observed during retroflexion; no bleeding was identified        A. COLON - SIGMOID, POLYP:   -- Hyperplastic polyp    ASSESSMENT/PLAN:    ***    Chela Hernandez MD

## 2024-10-10 ENCOUNTER — APPOINTMENT (OUTPATIENT)
Dept: GASTROENTEROLOGY | Facility: HOSPITAL | Age: 29
End: 2024-10-10
Payer: COMMERCIAL

## 2025-08-08 ENCOUNTER — APPOINTMENT (OUTPATIENT)
Dept: PRIMARY CARE | Facility: CLINIC | Age: 30
End: 2025-08-08
Payer: COMMERCIAL